# Patient Record
Sex: FEMALE | NOT HISPANIC OR LATINO
[De-identification: names, ages, dates, MRNs, and addresses within clinical notes are randomized per-mention and may not be internally consistent; named-entity substitution may affect disease eponyms.]

---

## 2017-11-07 ENCOUNTER — APPOINTMENT (OUTPATIENT)
Dept: CARDIOLOGY | Facility: CLINIC | Age: 52
End: 2017-11-07
Payer: COMMERCIAL

## 2017-11-07 ENCOUNTER — NON-APPOINTMENT (OUTPATIENT)
Age: 52
End: 2017-11-07

## 2017-11-07 VITALS
RESPIRATION RATE: 16 BRPM | OXYGEN SATURATION: 98 % | TEMPERATURE: 98.2 F | HEART RATE: 72 BPM | SYSTOLIC BLOOD PRESSURE: 121 MMHG | BODY MASS INDEX: 26.71 KG/M2 | WEIGHT: 147 LBS | HEIGHT: 62.2 IN | DIASTOLIC BLOOD PRESSURE: 84 MMHG

## 2017-11-07 DIAGNOSIS — R07.89 OTHER CHEST PAIN: ICD-10-CM

## 2017-11-07 PROCEDURE — 93306 TTE W/DOPPLER COMPLETE: CPT

## 2017-11-07 PROCEDURE — 93015 CV STRESS TEST SUPVJ I&R: CPT

## 2017-11-07 PROCEDURE — 93000 ELECTROCARDIOGRAM COMPLETE: CPT | Mod: 59

## 2017-11-07 PROCEDURE — 99204 OFFICE O/P NEW MOD 45 MIN: CPT | Mod: 25

## 2018-02-15 ENCOUNTER — EMERGENCY (EMERGENCY)
Facility: HOSPITAL | Age: 53
LOS: 1 days | Discharge: HOME | End: 2018-02-15
Attending: EMERGENCY MEDICINE | Admitting: EMERGENCY MEDICINE
Payer: COMMERCIAL

## 2018-02-15 VITALS
DIASTOLIC BLOOD PRESSURE: 69 MMHG | TEMPERATURE: 99 F | RESPIRATION RATE: 18 BRPM | HEART RATE: 63 BPM | SYSTOLIC BLOOD PRESSURE: 104 MMHG | OXYGEN SATURATION: 98 %

## 2018-02-15 VITALS
SYSTOLIC BLOOD PRESSURE: 116 MMHG | DIASTOLIC BLOOD PRESSURE: 60 MMHG | OXYGEN SATURATION: 100 % | HEART RATE: 89 BPM | RESPIRATION RATE: 22 BRPM | TEMPERATURE: 98 F

## 2018-02-15 LAB
ALBUMIN SERPL ELPH-MCNC: 4.7 G/DL — SIGNIFICANT CHANGE UP (ref 3.3–5)
ALP SERPL-CCNC: 32 U/L — LOW (ref 40–120)
ALT FLD-CCNC: 63 U/L RC — HIGH (ref 10–45)
ANION GAP SERPL CALC-SCNC: 16 MMOL/L — SIGNIFICANT CHANGE UP (ref 5–17)
AST SERPL-CCNC: 141 U/L — HIGH (ref 10–40)
BASE EXCESS BLDV CALC-SCNC: -0.4 MMOL/L — SIGNIFICANT CHANGE UP (ref -2–2)
BASE EXCESS BLDV CALC-SCNC: 0.8 MMOL/L — SIGNIFICANT CHANGE UP (ref -2–2)
BASOPHILS # BLD AUTO: 0 K/UL — SIGNIFICANT CHANGE UP (ref 0–0.2)
BASOPHILS NFR BLD AUTO: 0.1 % — SIGNIFICANT CHANGE UP (ref 0–2)
BILIRUB SERPL-MCNC: 0.9 MG/DL — SIGNIFICANT CHANGE UP (ref 0.2–1.2)
BUN SERPL-MCNC: 18 MG/DL — SIGNIFICANT CHANGE UP (ref 7–23)
CA-I SERPL-SCNC: 1.04 MMOL/L — LOW (ref 1.12–1.3)
CA-I SERPL-SCNC: 1.14 MMOL/L — SIGNIFICANT CHANGE UP (ref 1.12–1.3)
CALCIUM SERPL-MCNC: 9.5 MG/DL — SIGNIFICANT CHANGE UP (ref 8.4–10.5)
CHLORIDE BLDV-SCNC: 105 MMOL/L — SIGNIFICANT CHANGE UP (ref 96–108)
CHLORIDE BLDV-SCNC: 109 MMOL/L — HIGH (ref 96–108)
CHLORIDE SERPL-SCNC: 101 MMOL/L — SIGNIFICANT CHANGE UP (ref 96–108)
CO2 BLDV-SCNC: 25 MMOL/L — SIGNIFICANT CHANGE UP (ref 22–30)
CO2 BLDV-SCNC: 25 MMOL/L — SIGNIFICANT CHANGE UP (ref 22–30)
CO2 SERPL-SCNC: 21 MMOL/L — LOW (ref 22–31)
CREAT SERPL-MCNC: 0.6 MG/DL — SIGNIFICANT CHANGE UP (ref 0.5–1.3)
EOSINOPHIL # BLD AUTO: 0 K/UL — SIGNIFICANT CHANGE UP (ref 0–0.5)
EOSINOPHIL NFR BLD AUTO: 0.1 % — SIGNIFICANT CHANGE UP (ref 0–6)
GAS PNL BLDV: 132 MMOL/L — LOW (ref 136–145)
GAS PNL BLDV: 136 MMOL/L — SIGNIFICANT CHANGE UP (ref 136–145)
GAS PNL BLDV: SIGNIFICANT CHANGE UP
GAS PNL BLDV: SIGNIFICANT CHANGE UP
GLUCOSE BLDV-MCNC: 106 MG/DL — HIGH (ref 70–99)
GLUCOSE BLDV-MCNC: 159 MG/DL — HIGH (ref 70–99)
GLUCOSE SERPL-MCNC: 152 MG/DL — HIGH (ref 70–99)
HCG SERPL-ACNC: <2 MIU/ML — LOW (ref 5–24)
HCO3 BLDV-SCNC: 24 MMOL/L — SIGNIFICANT CHANGE UP (ref 21–29)
HCO3 BLDV-SCNC: 24 MMOL/L — SIGNIFICANT CHANGE UP (ref 21–29)
HCT VFR BLD CALC: 41.8 % — SIGNIFICANT CHANGE UP (ref 34.5–45)
HCT VFR BLDA CALC: 40 % — SIGNIFICANT CHANGE UP (ref 39–50)
HCT VFR BLDA CALC: 44 % — SIGNIFICANT CHANGE UP (ref 39–50)
HGB BLD CALC-MCNC: 12.9 G/DL — SIGNIFICANT CHANGE UP (ref 11.5–15.5)
HGB BLD CALC-MCNC: 14.5 G/DL — SIGNIFICANT CHANGE UP (ref 11.5–15.5)
HGB BLD-MCNC: 14.4 G/DL — SIGNIFICANT CHANGE UP (ref 11.5–15.5)
LACTATE BLDV-MCNC: 1.2 MMOL/L — SIGNIFICANT CHANGE UP (ref 0.7–2)
LACTATE BLDV-MCNC: 4 MMOL/L — CRITICAL HIGH (ref 0.7–2)
LIDOCAIN IGE QN: 26 U/L — SIGNIFICANT CHANGE UP (ref 7–60)
LYMPHOCYTES # BLD AUTO: 0.5 K/UL — LOW (ref 1–3.3)
LYMPHOCYTES # BLD AUTO: 5.7 % — LOW (ref 13–44)
MCHC RBC-ENTMCNC: 31 PG — SIGNIFICANT CHANGE UP (ref 27–34)
MCHC RBC-ENTMCNC: 34.4 GM/DL — SIGNIFICANT CHANGE UP (ref 32–36)
MCV RBC AUTO: 90.1 FL — SIGNIFICANT CHANGE UP (ref 80–100)
MONOCYTES # BLD AUTO: 0.2 K/UL — SIGNIFICANT CHANGE UP (ref 0–0.9)
MONOCYTES NFR BLD AUTO: 2 % — SIGNIFICANT CHANGE UP (ref 2–14)
NEUTROPHILS # BLD AUTO: 8.4 K/UL — HIGH (ref 1.8–7.4)
NEUTROPHILS NFR BLD AUTO: 92.1 % — HIGH (ref 43–77)
PCO2 BLDV: 37 MMHG — SIGNIFICANT CHANGE UP (ref 35–50)
PCO2 BLDV: 39 MMHG — SIGNIFICANT CHANGE UP (ref 35–50)
PH BLDV: 7.4 — SIGNIFICANT CHANGE UP (ref 7.35–7.45)
PH BLDV: 7.43 — SIGNIFICANT CHANGE UP (ref 7.35–7.45)
PLATELET # BLD AUTO: 170 K/UL — SIGNIFICANT CHANGE UP (ref 150–400)
PO2 BLDV: 22 MMHG — LOW (ref 25–45)
PO2 BLDV: 35 MMHG — SIGNIFICANT CHANGE UP (ref 25–45)
POTASSIUM BLDV-SCNC: 3.3 MMOL/L — LOW (ref 3.5–5)
POTASSIUM BLDV-SCNC: 4.2 MMOL/L — SIGNIFICANT CHANGE UP (ref 3.5–5)
POTASSIUM SERPL-MCNC: 3.9 MMOL/L — SIGNIFICANT CHANGE UP (ref 3.5–5.3)
POTASSIUM SERPL-SCNC: 3.9 MMOL/L — SIGNIFICANT CHANGE UP (ref 3.5–5.3)
PROT SERPL-MCNC: 7.7 G/DL — SIGNIFICANT CHANGE UP (ref 6–8.3)
RBC # BLD: 4.64 M/UL — SIGNIFICANT CHANGE UP (ref 3.8–5.2)
RBC # FLD: 11.1 % — SIGNIFICANT CHANGE UP (ref 10.3–14.5)
SAO2 % BLDV: 36 % — LOW (ref 67–88)
SAO2 % BLDV: 66 % — LOW (ref 67–88)
SODIUM SERPL-SCNC: 138 MMOL/L — SIGNIFICANT CHANGE UP (ref 135–145)
WBC # BLD: 9.1 K/UL — SIGNIFICANT CHANGE UP (ref 3.8–10.5)
WBC # FLD AUTO: 9.1 K/UL — SIGNIFICANT CHANGE UP (ref 3.8–10.5)

## 2018-02-15 PROCEDURE — 82947 ASSAY GLUCOSE BLOOD QUANT: CPT

## 2018-02-15 PROCEDURE — 76830 TRANSVAGINAL US NON-OB: CPT

## 2018-02-15 PROCEDURE — 76705 ECHO EXAM OF ABDOMEN: CPT | Mod: 26

## 2018-02-15 PROCEDURE — 80053 COMPREHEN METABOLIC PANEL: CPT

## 2018-02-15 PROCEDURE — 82803 BLOOD GASES ANY COMBINATION: CPT

## 2018-02-15 PROCEDURE — 71045 X-RAY EXAM CHEST 1 VIEW: CPT | Mod: 26

## 2018-02-15 PROCEDURE — 84702 CHORIONIC GONADOTROPIN TEST: CPT

## 2018-02-15 PROCEDURE — 84132 ASSAY OF SERUM POTASSIUM: CPT

## 2018-02-15 PROCEDURE — 96375 TX/PRO/DX INJ NEW DRUG ADDON: CPT

## 2018-02-15 PROCEDURE — 76705 ECHO EXAM OF ABDOMEN: CPT

## 2018-02-15 PROCEDURE — 96374 THER/PROPH/DIAG INJ IV PUSH: CPT | Mod: XU

## 2018-02-15 PROCEDURE — 71045 X-RAY EXAM CHEST 1 VIEW: CPT

## 2018-02-15 PROCEDURE — 85027 COMPLETE CBC AUTOMATED: CPT

## 2018-02-15 PROCEDURE — 82565 ASSAY OF CREATININE: CPT

## 2018-02-15 PROCEDURE — 83690 ASSAY OF LIPASE: CPT

## 2018-02-15 PROCEDURE — 99285 EMERGENCY DEPT VISIT HI MDM: CPT | Mod: 25

## 2018-02-15 PROCEDURE — 99284 EMERGENCY DEPT VISIT MOD MDM: CPT | Mod: 25

## 2018-02-15 PROCEDURE — 83605 ASSAY OF LACTIC ACID: CPT

## 2018-02-15 PROCEDURE — 82330 ASSAY OF CALCIUM: CPT

## 2018-02-15 PROCEDURE — 76830 TRANSVAGINAL US NON-OB: CPT | Mod: 26

## 2018-02-15 PROCEDURE — 93010 ELECTROCARDIOGRAM REPORT: CPT

## 2018-02-15 PROCEDURE — 93005 ELECTROCARDIOGRAM TRACING: CPT

## 2018-02-15 PROCEDURE — 82435 ASSAY OF BLOOD CHLORIDE: CPT

## 2018-02-15 PROCEDURE — 93975 VASCULAR STUDY: CPT | Mod: 26

## 2018-02-15 PROCEDURE — 84295 ASSAY OF SERUM SODIUM: CPT

## 2018-02-15 PROCEDURE — 74177 CT ABD & PELVIS W/CONTRAST: CPT | Mod: 26

## 2018-02-15 PROCEDURE — 85014 HEMATOCRIT: CPT

## 2018-02-15 PROCEDURE — 74177 CT ABD & PELVIS W/CONTRAST: CPT

## 2018-02-15 PROCEDURE — 93975 VASCULAR STUDY: CPT

## 2018-02-15 RX ORDER — MORPHINE SULFATE 50 MG/1
4 CAPSULE, EXTENDED RELEASE ORAL ONCE
Qty: 0 | Refills: 0 | Status: DISCONTINUED | OUTPATIENT
Start: 2018-02-15 | End: 2018-02-15

## 2018-02-15 RX ORDER — SODIUM CHLORIDE 9 MG/ML
1000 INJECTION INTRAMUSCULAR; INTRAVENOUS; SUBCUTANEOUS ONCE
Qty: 0 | Refills: 0 | Status: COMPLETED | OUTPATIENT
Start: 2018-02-15 | End: 2018-02-15

## 2018-02-15 RX ORDER — FAMOTIDINE 10 MG/ML
20 INJECTION INTRAVENOUS ONCE
Qty: 0 | Refills: 0 | Status: COMPLETED | OUTPATIENT
Start: 2018-02-15 | End: 2018-02-15

## 2018-02-15 RX ORDER — ONDANSETRON 8 MG/1
4 TABLET, FILM COATED ORAL ONCE
Qty: 0 | Refills: 0 | Status: COMPLETED | OUTPATIENT
Start: 2018-02-15 | End: 2018-02-15

## 2018-02-15 RX ADMIN — ONDANSETRON 4 MILLIGRAM(S): 8 TABLET, FILM COATED ORAL at 11:16

## 2018-02-15 RX ADMIN — SODIUM CHLORIDE 4000 MILLILITER(S): 9 INJECTION INTRAMUSCULAR; INTRAVENOUS; SUBCUTANEOUS at 10:58

## 2018-02-15 RX ADMIN — MORPHINE SULFATE 4 MILLIGRAM(S): 50 CAPSULE, EXTENDED RELEASE ORAL at 12:10

## 2018-02-15 RX ADMIN — SODIUM CHLORIDE 4000 MILLILITER(S): 9 INJECTION INTRAMUSCULAR; INTRAVENOUS; SUBCUTANEOUS at 11:07

## 2018-02-15 RX ADMIN — MORPHINE SULFATE 4 MILLIGRAM(S): 50 CAPSULE, EXTENDED RELEASE ORAL at 10:58

## 2018-02-15 RX ADMIN — FAMOTIDINE 20 MILLIGRAM(S): 10 INJECTION INTRAVENOUS at 10:58

## 2018-02-15 NOTE — ED ADULT NURSE REASSESSMENT NOTE - NS ED NURSE REASSESS COMMENT FT1
Pt awaiting CT scan results, states pain has improved to 3/10. No nausea/vomiting at this time, verbalized improvement. Pending repeat VBG at this time. Resting comfortable in stretcher, no acute distress. Family at the bedside.

## 2018-02-15 NOTE — ED PROVIDER NOTE - NS ED ROS FT
ROS: no CP/SOB. no cough. no fever. +n/v no d/c. +abd pain. no rash. no bleeding. no urinary complaints. no weakness. no vision changes. no HA. no neck/back pain. no extremity swelling/deformity. No change in mental status.

## 2018-02-15 NOTE — ED PROVIDER NOTE - MEDICAL DECISION MAKING DETAILS
Attending MD Miranda: 51 yo female no PMH presents with severe epigastric pain, waxing and waning nausea, no vomiting, family with GI illness at home.  Attending MD Miranda: A & O x 3, NAD, HEENT WNL and no facial asymmetry; lungs CTAB, heart with reg rhythm without murmur; abdomen + luis's epigastric and RUQ; extremities with no edema; skin with no rashes, neuro exam non focal with no motor or sensory deficits.  Plan:  EKG, labs, RUQ SONO.

## 2018-02-15 NOTE — ED PROVIDER NOTE - PHYSICAL EXAMINATION
Gen: uncomfortable, holding upper abd, AOx3  Head: NCAT  HEENT: PERRL, oral mucosa moist, normal conjunctiva, neck supple  Lung: CTAB, no respiratory distress  CV: rrr, no murmur, Normal perfusion  Abd: soft, +epigastric ttp, +murphys, no rebound/guarding, ND  MSK: No edema, no visible deformities  Neuro: No focal neurologic deficits  Skin: No rash   Psych: normal affect

## 2018-02-15 NOTE — ED ADULT NURSE NOTE - OBJECTIVE STATEMENT
53 yo female arrived to ED by EMS with c/o abdominal pain. Pt states pain sudden onset 1 hour ago in epigastric region/ RUQ. Pt states she had oatmeal with milk and symptoms began after. Pt denies any PMH, allergies, or taking any medication. Pt denies shortness of breath, dyspnea or chest pain. Pt endorses nausea, and dry heaving with no vomit present. Pt abdomen round, nondistended, tender to touch in epigastric region. States normal bowel movement this morning, Pt VSS, afebrile. IV in place, labs drawn and sent. Safety maintained, will reassess. 53 yo female arrived to ED by EMS with c/o abdominal pain. Pt states pain sudden onset 1 hour ago in epigastric region/ RUQ. Pt. presents writhing in pain. Pt states she had oatmeal with milk and symptoms began after. Pt denies any PMH, allergies, or taking any medication. Pt denies shortness of breath, dyspnea or chest pain. Pt endorses nausea, and dry heaving with no vomit present. Pt abdomen round, nondistended, tender to touch in epigastric region. States normal bowel movement this morning, Pt VSS, afebrile. Pt. reports she is currently menstruating. IV in place, labs drawn and sent. Safety maintained, will reassess. 53 yo female arrived to ED by EMS with c/o abdominal pain. Pt states pain sudden onset 1 hour ago in epigastric region/ RUQ. Pt. presents writhing in pain. Pt states she had oatmeal with milk and symptoms began after. Pt denies any PMH, allergies, or taking any medication. Pt denies shortness of breath, dyspnea or chest pain. Pt endorses nausea, and dry heaving with no vomit present. Pt abdomen round, nondistended, tender to touch in epigastric region and RUQ. Pt. still has appendix, but nontender to RLQ - negative rebound tenderness. States normal bowel movement this morning, Pt VSS, afebrile. Pt. reports she is currently menstruating. IV in place, labs drawn and sent. Safety maintained, will reassess.

## 2018-02-15 NOTE — ED PROVIDER NOTE - OBJECTIVE STATEMENT
51yo F with abd pain x this AM, severe epigastric, nonradiating, unable to describe, constant but more severe at times, started after eating breakfast- cereal. +nausea +dry heaving. no vomit/diarrhea. 2 children with recent GI illness. no fever/chills/travel. no CP/SOB. no h/o abd sx. never had this pain before

## 2018-02-15 NOTE — CONSULT NOTE ADULT - ASSESSMENT
53yo F with abd pain this AM, consulted for possible hydrosalpinx vs peritoneal inclusion cyst seen on imaging  Pt clinically stable with no signs of infection on exam.  Benign Gyn exam. Imaging unclear if hydrosalpinx vs. inclusion cysts    - f/u results of GC culture from ED- no indication for epimeric treatment   -recommend pt follows with Gyn in [place], recommend she f/u with a TVUS in 3 months     Janet Iqbal PGY1     seen w/ Dr. Damian and Dr. Lan  d/w Dr. Diez

## 2018-02-15 NOTE — ED PROVIDER NOTE - CARE PLAN
Principal Discharge DX:	Upper abdominal pain  Secondary Diagnosis:	Hydrosalpinx  Secondary Diagnosis:	Nausea and vomiting Principal Discharge DX:	Upper abdominal pain  Assessment and plan of treatment:	1. Return to ED for worsening, progressive or any other concerning symptoms   2. Follow up with your primary care doctor in 2-3days   3. Take motrin 600mg every 6 hours as needed for pain   4. Drink plenty of fluids to stay hydrated   5. Follow up with your private gynecologist about the fluid near your ovary  Secondary Diagnosis:	Hydrosalpinx  Secondary Diagnosis:	Nausea and vomiting

## 2018-02-15 NOTE — CONSULT NOTE ADULT - ATTENDING COMMENTS
52 yr with epigastric pain. She had an incidental finding of a hydrosalpinx. She is afebrile and has a normal WBC. She will follow up with her primary gyn

## 2018-02-15 NOTE — ED PROVIDER NOTE - PLAN OF CARE
1. Return to ED for worsening, progressive or any other concerning symptoms   2. Follow up with your primary care doctor in 2-3days   3. Take motrin 600mg every 6 hours as needed for pain   4. Drink plenty of fluids to stay hydrated   5. Follow up with your private gynecologist about the fluid near your ovary

## 2018-02-15 NOTE — CONSULT NOTE ADULT - SUBJECTIVE AND OBJECTIVE BOX
MARY LOU RENEE  52y  Female [id number]    HPI:  51yo F with abd pain x this AM, severe epigastric, nonradiating, unable to describe, constant but more severe at times, started after eating breakfast- cereal. +nausea +dry heaving. no vomit/diarrhea. 2 children with recent GI illness. no fever/chills/travel. no CP/SOB. no h/o abd sx. never had this pain before      Name of GYN Physician:     POB:      Pgyn: Denies fibroids, cysts, STI's, Abnormal pap smears     Home meds:       Social History:  Denies smoking use, drug use, alcohol use.  +occasional social alcohol use    Vital Signs Last 24 Hrs  T(C): 36.9 (15 Feb 2018 15:53), Max: 36.9 (15 Feb 2018 14:23)  T(F): 98.5 (15 Feb 2018 15:53), Max: 98.5 (15 Feb 2018 15:53)  HR: 85 (15 Feb 2018 14:23) (85 - 89)  BP: 102/64 (15 Feb 2018 14:23) (102/64 - 116/60)  BP(mean): --  RR: 20 (15 Feb 2018 14:23) (20 - 22)  SpO2: 95% (15 Feb 2018 14:23) (95% - 100%)    Physical Exam:   General: sitting comfortablt in bed, NAD   HEENT: neck supple, full ROM  CV: RR S1S2 no m/r/g  Lungs: CTA b/l  Back: No CVA tenderness  Abd: Soft, non-tender, non-distended.  Bowel sounds present.    :  No bleeding on pad.    External labia wnl.  Bimanual exam with no cervical motion tenderness, uterus wnl, adnexa non palpable b/l.    Speculum Exam: No active bleeding from os.  Physiologic discharge.    Ext: non-tender b/l, no edema     LABS:                            14.4   9.1   )-----------( 170      ( 15 Feb 2018 10:53 )             41.8     02-15    138  |  101  |  18  ----------------------------<  152<H>  3.9   |  21<L>  |  0.60    Ca    9.5      15 Feb 2018 10:53    TPro  7.7  /  Alb  4.7  /  TBili  0.9  /  DBili  x   /  AST  141<H>  /  ALT  63<H>  /  AlkPhos  32<L>  02-15    I&O's Detail          RADIOLOGY & ADDITIONAL STUDIES:  TVUS: IMPRESSION:    Loculated, slightly tubular cystic structure in the left adnexa may   represent hydrosalpinx or a peritoneal inclusion cyst.    Small bilateral ovarian lesions may represent endometriomas or   hemorrhagic cysts; consider follow-up sonogram or MRI to further evaluate.    CTAP: Loculated fluid containing structure within the left adnexal region   possibly representing hydrosalpinx. Pelvic ultrasound is recommended for   further evaluation. MARY LOU RENEE  52y  Female [id number]    [facility] ID number: [id number] Nilesh Shay.    HPI:    , 1 , 1 C/s, SABx2    51yo F with abd pain x this AM, severe epigastric, nonradiating, unable to describe, constant but more severe at times, started after eating breakfast- cereal. +nausea +dry heaving. no vomit/diarrhea. 2 children with recent GI illness. no fever/chills/travel. no CP/SOB. no h/o abd sx. never had this pain before    Pt's chief complaint is epigastric pain.  Pain began this morning, is epigastric, w nausea.     Annual physical last year, per pt was fine.    GYN in [place], Dr. Mendoza?  Last saw in 2017.    Pt does not know if she has had a pap smear.  Still having regular periods, LMP=2/6.  Had an TVUS 5yrs ago for painful periods, small cyst, otherwise nl.    Denies pelvic pain, abnormal discharge.  Is not sexually active currently, last time was around [place].  States  is not around currently.  No hx of STIs.  1 lifetime male sexual partner.      ROS: denies everything    PMH:   PSH: denies      Name of GYN Physician:     POB:      Pgyn: Denies fibroids, cysts, STI's, Abnormal pap smears     Home meds:       Social History:  Denies smoking use, drug use, alcohol use.  +occasional social alcohol use     Vital Signs Last 24 Hrs  T(C): 36.9 (15 Feb 2018 15:53), Max: 36.9 (15 Feb 2018 14:23)  T(F): 98.5 (15 Feb 2018 15:53), Max: 98.5 (15 Feb 2018 15:53)  HR: 85 (15 Feb 2018 14:23) (85 - 89)  BP: 102/64 (15 Feb 2018 14:23) (102/64 - 116/60)  BP(mean): --  RR: 20 (15 Feb 2018 14:23) (20 - 22)  SpO2: 95% (15 Feb 2018 14:23) (95% - 100%)    Physical Exam:   General: sitting comfortablt in bed, NAD   HEENT: neck supple, full ROM  CV: RR S1S2 no m/r/g  Lungs: CTA b/l  Back: No CVA tenderness  Abd: Soft, non-tender, non-distended.  Bowel sounds present.    :  No bleeding on pad.    External labia wnl.  Bimanual exam with no cervical motion tenderness, uterus wnl, adnexa non palpable b/l.    Speculum Exam: No active bleeding from os.  Physiologic discharge.    Ext: non-tender b/l, no edema     LABS:                            14.4   9.1   )-----------( 170      ( 15 Feb 2018 10:53 )             41.8     02-15    138  |  101  |  18  ----------------------------<  152<H>  3.9   |  21<L>  |  0.60    Ca    9.5      15 Feb 2018 10:53    TPro  7.7  /  Alb  4.7  /  TBili  0.9  /  DBili  x   /  AST  141<H>  /  ALT  63<H>  /  AlkPhos  32<L>  02-15    I&O's Detail          RADIOLOGY & ADDITIONAL STUDIES:  TVUS: IMPRESSION:    Loculated, slightly tubular cystic structure in the left adnexa may   represent hydrosalpinx or a peritoneal inclusion cyst.    Small bilateral ovarian lesions may represent endometriomas or   hemorrhagic cysts; consider follow-up sonogram or MRI to further evaluate.    CTAP: Loculated fluid containing structure within the left adnexal region   possibly representing hydrosalpinx. Pelvic ultrasound is recommended for   further evaluation. MARY LOU RENEE  52y  Female [id number]    [employer] ID number: [id number] Nilesh Shay.    HPI: 51yo F with abd pain x this AM, severe epigastric, nonradiating, unable to describe, constant but more severe at times, started after eating breakfast- cereal. +nausea +dry heaving. no vomit/diarrhea. 2 children with recent GI illness. no fever/chills/travel. no CP/SOB. no h/o abd sx. never had this pain before    Pt's chief complaint is epigastric pain.  Pain began this morning, is epigastric, w nausea.     Annual physical last year, per pt was fine.    GYN in [place], Dr. Mendoza?  Last saw in 2017.    Pt does not know if she has had a pap smear.  Still having regular periods, LMP=2/6.  Had an TVUS 5yrs ago for painful periods, small cyst, otherwise nl.    Denies pelvic pain, abnormal discharge.  Is not sexually active currently, last time was around [place].  States  is not around currently.  No hx of STIs.  1 lifetime male sexual partner.      ROS: denies everything    PMH:   PSH: C/Sx1   Name of GYN Physician:     POBHx: , 1 , 1 C/s, SABx2    Pgyn: Denies fibroids, cysts, STI's, Abnormal pap smears     Social History:  Denies smoking use, drug use, alcohol use.  +occasional social alcohol use     Vital Signs Last 24 Hrs  T(C): 36.9 (15 Feb 2018 15:53), Max: 36.9 (15 Feb 2018 14:23)  T(F): 98.5 (15 Feb 2018 15:53), Max: 98.5 (15 Feb 2018 15:53)  HR: 85 (15 Feb 2018 14:23) (85 - 89)  BP: 102/64 (15 Feb 2018 14:23) (102/64 - 116/60)  BP(mean): --  RR: 20 (15 Feb 2018 14:23) (20 - 22)  SpO2: 95% (15 Feb 2018 14:23) (95% - 100%)    Physical Exam:   General: sitting comfortably in bed, NAD   Abd: Soft, non-tender, non-distended.    :  Min bleeding on pad.    External labia wnl.  Bimanual exam with no cervical motion tenderness, uterus wnl, adnexa non palpable b/l.    Speculum Exam: No active bleeding from os.   Ext: non-tender b/l, no edema     LABS:                            14.4   9.1   )-----------( 170      ( 15 Feb 2018 10:53 )             41.8     02-15    138  |  101  |  18  ----------------------------<  152<H>  3.9   |  21<L>  |  0.60    Ca    9.5      15 Feb 2018 10:53    TPro  7.7  /  Alb  4.7  /  TBili  0.9  /  DBili  x   /  AST  141<H>  /  ALT  63<H>  /  AlkPhos  32<L>  02-15    I&O's Detail        RADIOLOGY & ADDITIONAL STUDIES:  TVUS: IMPRESSION:    Loculated, slightly tubular cystic structure in the left adnexa may   represent hydrosalpinx or a peritoneal inclusion cyst.    Small bilateral ovarian lesions may represent endometriomas or   hemorrhagic cysts; consider follow-up sonogram or MRI to further evaluate.    CTAP: Loculated fluid containing structure within the left adnexal region   possibly representing hydrosalpinx. Pelvic ultrasound is recommended for   further evaluation. MARY LOU RENEE  52y  Female [id number]    [employer] ID number: [id number] Nilesh Shay.    HPI: 53yo F with abd pain x this AM, severe epigastric, nonradiating, constant but more severe at times, started after eating breakfast +nausea +dry heaving. no vomit/diarrhea. 2 children with recent GI illness. no fever/chills/travel. no CP/SOB. no h/o abd sx. never had this pain before    Gyn Hx: Pt does not know if she has had a pap smear.  Still having regular periods, LMP=2/6.  Had an TVUS 5yrs ago for painful periods, small cyst, otherwise nl.  Denies pelvic pain, abnormal discharge.  Is not sexually active currently, last time was around [place].  States  is not around currently.  No hx of STIs.  1 lifetime male sexual partner.     Annual physical last year, per pt was fine.    GYN in [place], Dr. Mendoza?  Last saw in 2017.         ROS: denies everything    PSH: C/Sx1   POBHx: , 1 , 1 C/s, SABx2    Pgyn: Denies fibroids, cysts, STI's, Abnormal pap smears     Social History:  Denies smoking use, drug use, alcohol use.  +occasional social alcohol use     Vital Signs Last 24 Hrs  T(C): 36.9 (15 Feb 2018 15:53), Max: 36.9 (15 Feb 2018 14:23)  T(F): 98.5 (15 Feb 2018 15:53), Max: 98.5 (15 Feb 2018 15:53)  HR: 85 (15 Feb 2018 14:23) (85 - 89)  BP: 102/64 (15 Feb 2018 14:23) (102/64 - 116/60)  BP(mean): --  RR: 20 (15 Feb 2018 14:23) (20 - 22)  SpO2: 95% (15 Feb 2018 14:23) (95% - 100%)    Physical Exam:   General: sitting comfortably in bed, NAD   Abd: Soft, non-tender, non-distended.    :  Min bleeding on pad.    External labia wnl.  Bimanual exam with no cervical motion tenderness, uterus wnl, adnexa non palpable b/l.    Speculum Exam: No active bleeding from os.   Ext: non-tender b/l, no edema     LABS:                            14.4   9.1   )-----------( 170      ( 15 Feb 2018 10:53 )             41.8     02-15    138  |  101  |  18  ----------------------------<  152<H>  3.9   |  21<L>  |  0.60    Ca    9.5      15 Feb 2018 10:53    TPro  7.7  /  Alb  4.7  /  TBili  0.9  /  DBili  x   /  AST  141<H>  /  ALT  63<H>  /  AlkPhos  32<L>  02-15    I&O's Detail        RADIOLOGY & ADDITIONAL STUDIES:  TVUS: IMPRESSION:    Loculated, slightly tubular cystic structure in the left adnexa may   represent hydrosalpinx or a peritoneal inclusion cyst.    Small bilateral ovarian lesions may represent endometriomas or   hemorrhagic cysts; consider follow-up sonogram or MRI to further evaluate.    CTAP: Loculated fluid containing structure within the left adnexal region   possibly representing hydrosalpinx. Pelvic ultrasound is recommended for   further evaluation. MARY LOU RENEE  52y  Female [id number]    [employer] ID number: [id number] Nilesh Shay.    HPI: 53yo F with abd pain x this AM, severe epigastric, nonradiating, constant but more severe at times, started after eating breakfast +nausea +dry heaving. no vomit/diarrhea. 2 children with recent GI illness. no fever/chills/travel. no CP/SOB. no h/o abd sx. never had this pain before    Gyn Hx: Pt does not know if she has had a pap smear.  Still having regular periods, LMP=2/6.  Had an TVUS 5yrs ago for painful periods, small cyst, otherwise nl.  Denies pelvic pain, abnormal discharge.  Is not sexually active currently, last time was around [place].  States  is not around currently.  No hx of STIs.  1 lifetime male sexual partner.     Annual physical last year, per pt was fine.    GYN in [place], Dr. Mendoza?  Last saw in 2017.      PSH: C/Sx1   POBHx: , 1 , 1 C/s, SABx2    Pgyn: Denies fibroids, cysts, STI's, Abnormal pap smears     Social History:  Denies smoking use, drug use, alcohol use.  +occasional social alcohol use     Vital Signs Last 24 Hrs  T(C): 36.9 (15 Feb 2018 15:53), Max: 36.9 (15 Feb 2018 14:23)  T(F): 98.5 (15 Feb 2018 15:53), Max: 98.5 (15 Feb 2018 15:53)  HR: 85 (15 Feb 2018 14:23) (85 - 89)  BP: 102/64 (15 Feb 2018 14:23) (102/64 - 116/60)  BP(mean): --  RR: 20 (15 Feb 2018 14:23) (20 - 22)  SpO2: 95% (15 Feb 2018 14:23) (95% - 100%)    Physical Exam:   General: sitting comfortably in bed, NAD   Abd: Soft, non-tender, non-distended.    :  Min bleeding on pad.    External labia wnl.  Bimanual exam with no cervical motion tenderness, uterus wnl, adnexa non palpable b/l.    Speculum Exam: No active bleeding from os.   Ext: non-tender b/l, no edema     LABS:                            14.4   9.1   )-----------( 170      ( 15 Feb 2018 10:53 )             41.8     02-15    138  |  101  |  18  ----------------------------<  152<H>  3.9   |  21<L>  |  0.60    Ca    9.5      15 Feb 2018 10:53    TPro  7.7  /  Alb  4.7  /  TBili  0.9  /  DBili  x   /  AST  141<H>  /  ALT  63<H>  /  AlkPhos  32<L>  02-15    I&O's Detail        RADIOLOGY & ADDITIONAL STUDIES:  TVUS: IMPRESSION:    Loculated, slightly tubular cystic structure in the left adnexa may   represent hydrosalpinx or a peritoneal inclusion cyst.    Small bilateral ovarian lesions may represent endometriomas or   hemorrhagic cysts; consider follow-up sonogram or MRI to further evaluate.    CTAP: Loculated fluid containing structure within the left adnexal region   possibly representing hydrosalpinx. Pelvic ultrasound is recommended for   further evaluation.

## 2018-02-15 NOTE — ED PROVIDER NOTE - PROGRESS NOTE DETAILS
CT with concern for possible hydrosalpinx, will get TVUS, spoke with patient using mandarin , no recent sexual activity, no STds, no vag discharge/pain. no lower abd pain. currently on menses. Bedside Pelvic exam- no CMT, no adnexal ttp/masses. pending sono -Slowey DO GYN cleared for outpatient Follow up -Jasmin JACOB abdominal pain completely resolved, no vomiting, tolerating PO. likely viral gastro/enteritis with mild transaminitis, will D/C with outpatient Follow up -Jasmin JACOB Attd:  Received sign out on patient pending TVUS, OBGYN consultation and dispo.  TVUS with possible hydrosalpinx, OBGYN evaluated patient and cleared for discharge with outpatient follow up - does not believe findings are acute or source of patients pain.  Patients pain now resolved and patient tolerating PO - will discharge. Kai Gil M.D.

## 2018-02-15 NOTE — ED PROVIDER NOTE - ATTENDING CONTRIBUTION TO CARE
Attending MD Miranda:  I personally have seen and examined this patient.  Resident note reviewed and agree on plan of care and except where noted.  See MDM for details.

## 2018-02-16 LAB
C TRACH RRNA SPEC QL NAA+PROBE: SIGNIFICANT CHANGE UP
N GONORRHOEA RRNA SPEC QL NAA+PROBE: SIGNIFICANT CHANGE UP
SPECIMEN SOURCE: SIGNIFICANT CHANGE UP

## 2022-04-11 NOTE — ED PROCEDURE NOTE - NS ED PROCEDURE ASSISTED BY
Patient notified of below.  Verbalized understanding.    Letter written placed in  drawer.   Supervision was available